# Patient Record
Sex: FEMALE | Race: WHITE | URBAN - METROPOLITAN AREA
[De-identification: names, ages, dates, MRNs, and addresses within clinical notes are randomized per-mention and may not be internally consistent; named-entity substitution may affect disease eponyms.]

---

## 2020-07-25 ENCOUNTER — TELEPHONE ENCOUNTER (OUTPATIENT)
Dept: URBAN - METROPOLITAN AREA CLINIC 13 | Facility: CLINIC | Age: 35
End: 2020-07-25

## 2020-07-26 ENCOUNTER — TELEPHONE ENCOUNTER (OUTPATIENT)
Dept: URBAN - METROPOLITAN AREA CLINIC 13 | Facility: CLINIC | Age: 35
End: 2020-07-26

## 2023-03-08 ENCOUNTER — OFFICE VISIT (OUTPATIENT)
Dept: URBAN - METROPOLITAN AREA CLINIC 113 | Facility: CLINIC | Age: 38
End: 2023-03-08
Payer: COMMERCIAL

## 2023-03-08 ENCOUNTER — WEB ENCOUNTER (OUTPATIENT)
Dept: URBAN - METROPOLITAN AREA CLINIC 113 | Facility: CLINIC | Age: 38
End: 2023-03-08

## 2023-03-08 ENCOUNTER — LAB OUTSIDE AN ENCOUNTER (OUTPATIENT)
Dept: URBAN - METROPOLITAN AREA CLINIC 113 | Facility: CLINIC | Age: 38
End: 2023-03-08

## 2023-03-08 ENCOUNTER — DASHBOARD ENCOUNTERS (OUTPATIENT)
Age: 38
End: 2023-03-08

## 2023-03-08 VITALS
HEART RATE: 82 BPM | BODY MASS INDEX: 29.81 KG/M2 | HEIGHT: 64 IN | SYSTOLIC BLOOD PRESSURE: 130 MMHG | WEIGHT: 174.6 LBS | DIASTOLIC BLOOD PRESSURE: 87 MMHG | RESPIRATION RATE: 18 BRPM | TEMPERATURE: 98 F

## 2023-03-08 DIAGNOSIS — K62.89 RECTAL PAIN: ICD-10-CM

## 2023-03-08 PROCEDURE — 99204 OFFICE O/P NEW MOD 45 MIN: CPT | Performed by: INTERNAL MEDICINE

## 2023-03-08 NOTE — HPI-TODAY'S VISIT:
This is a 37-year-old female with a history of malignant melanoma, hypothyroidism, anxiety endometriosis, right ovarian cyst referred from Dr. Cabrera for pain associated with defecation. She was last seen in the office in 2008 for nonulcer dyspepsia.  She was improved on Celexa which she was to continue. Flexible sigmoidoscopy 7/22/2008 performed for rectal bleeding demonstrated a normal exam.  EGD 7/22/2008: Mild antral gastritis, otherwise unremarkable endoscopy.   biopsies negative for H. pylori. MRI of the pelvis with and without contrast 2/17/2023:Findings consistent with endometriosis involvement along the dorsal uterine margin and within the retrouterine pouch with abutment of the ventral rectum.  Additional complex hemorrhagic cyst or endometrioma of the right ovary measuring 3.1 cm.  No endometriosis related complications appreciated. She has a history of abdominal pain that usually occurs during her menstrual cycle but may occur at other times.  She reports rectal pain associated with her menstrual cycle only.  This occurs when she is having a bowel movement.  She denies red blood per rectum or melena.  She denies any other abdominal symptoms.  She denies a family history of colon cancer or polyps.

## 2023-03-08 NOTE — HPI-OTHER HISTORIES
Flexible sigmoidoscopy 7/22/2008 performed for rectal bleeding demonstrated a normal exam.   EGD 7/22/2008: Mild antral gastritis, otherwise unremarkable endoscopy.   biopsies negative for H. pylori.

## 2023-03-22 ENCOUNTER — OFFICE VISIT (OUTPATIENT)
Dept: URBAN - METROPOLITAN AREA SURGERY CENTER 25 | Facility: SURGERY CENTER | Age: 38
End: 2023-03-22
Payer: COMMERCIAL

## 2023-03-22 DIAGNOSIS — R93.3 ABN FINDINGS-GI TRACT: ICD-10-CM

## 2023-03-22 PROCEDURE — G8907 PT DOC NO EVENTS ON DISCHARG: HCPCS | Performed by: INTERNAL MEDICINE

## 2023-03-22 PROCEDURE — 45378 DIAGNOSTIC COLONOSCOPY: CPT | Performed by: INTERNAL MEDICINE

## 2023-06-14 ENCOUNTER — OFFICE VISIT (OUTPATIENT)
Dept: URBAN - METROPOLITAN AREA CLINIC 113 | Facility: CLINIC | Age: 38
End: 2023-06-14